# Patient Record
Sex: MALE | Race: WHITE | NOT HISPANIC OR LATINO | URBAN - METROPOLITAN AREA
[De-identification: names, ages, dates, MRNs, and addresses within clinical notes are randomized per-mention and may not be internally consistent; named-entity substitution may affect disease eponyms.]

---

## 2017-02-28 ENCOUNTER — APPOINTMENT (RX ONLY)
Dept: URBAN - METROPOLITAN AREA CLINIC 46 | Facility: CLINIC | Age: 32
Setting detail: DERMATOLOGY
End: 2017-02-28

## 2017-02-28 DIAGNOSIS — L64.8 OTHER ANDROGENIC ALOPECIA: ICD-10-CM

## 2017-02-28 PROCEDURE — ? COSMETIC CONSULTATION - NEOGRAFT

## 2017-03-07 ENCOUNTER — APPOINTMENT (RX ONLY)
Dept: URBAN - METROPOLITAN AREA CLINIC 46 | Facility: CLINIC | Age: 32
Setting detail: DERMATOLOGY
End: 2017-03-07

## 2017-03-07 VITALS — DIASTOLIC BLOOD PRESSURE: 70 MMHG | SYSTOLIC BLOOD PRESSURE: 110 MMHG

## 2017-03-07 DIAGNOSIS — L64.8 OTHER ANDROGENIC ALOPECIA: ICD-10-CM

## 2017-03-07 PROCEDURE — ? NEOGRAFT HAIR RESTORATION

## 2017-03-07 NOTE — HPI: COSMETIC PROCEDURE
Additional History: Patient is here for a Neograft procedure, placing 1200 grafts on his frontal hairline.

## 2017-03-07 NOTE — PROCEDURE: NEOGRAFT HAIR RESTORATION
Detail Level: Zone
Donor Site Anesthesia Type: 1% lidocaine with epinephrine
Price (Use Numbers Only, No Special Characters Or $): 9479.00
Recipient Site Preparation And Placement: Numerous individual transplantation sites were created within the recipient site. The previously harvested follicles were then placed in these transplantation sites.
Donor Site Anesthesia Volume In Cc: 10cc
Consent: Written consent obtained, risks reviewed including but not limited to pain, scarring, infection and incomplete improvement of alopecia.  Patient understands the procedure is cosmetic in nature and will require out of pocket payment.
Hemostasis: Electrocautery
Length Of Donor Strip In Cm If Applicable(Optional): 0
Post-Op: Following the procedure the donor and recipient sites were cleaned and ointment was applied. Postcare instructions were reviewed in detail with the paitent and they were given a copy to take home. All of the patient's questions were answered prior to leaving.
Donor Location: posterior scalp
Surgeon: Donna
Donor Site Anesthesia Method: local infiltration
Surgical Preparation: After consent was obtained, the donor site and recipient site were cleaned and prepped in the usual fashion.  Anesthesia was obtained as outlined above. The appropriate hair within and immediately surrounding the donor site was trimmed to the desired length.
Estimated Blood Loss (Cc): minimal
Follicle Duvall- Neograft Method: After the donor site was prepped and anesthetized numerous individual follicles were harvested using the NeoGraft procedure.
Wound Check: 7 days
Number Of Grafts (Optional): 1079
Quality Of Donor Hair: good
Recipient Site Anesthesia Volume In Cc: 6cc
Post-Care Instructions: 1. Alcoholic beverages, ibuprofen and aspirin are not to be taken for 48 hours after surgery.  We will provide you with a prescription for pain medications.  2. On limited occasions, minor bleeding may occur from the transplant site or donor area. This can be controlled by applying gentle but firm pressure on the area for not less than 5-10 minutes with a dampened gauze pad which we will provide for you.  PRESS ONLY, DO NOT RUB.  3.  Sleep in an upright position the first night after surgery, either in a recliner or prop up your head with two pillows. This will help prevent swelling of the scalp and forhead. Try not to sleep on your face.  4.  To prevent swelling of the forehead or around the eyes, apply an ice pack (pack of frozen peas will work well) over the eyebrows for 5-10 minutes every hour.  DO NOT APPLY ICE TO THE GRAFTS. If swelling occurs it will appear on the second or third day after surgery. The swelling is part of the normal healing process, and will usually disappear with 48 hours. Begin using ice packs immediately after surgery and continue for the next 72 hours. Do not use hot compresses, this will intensify swelling.  5. Scabbing will form over the transplant sites and solidify the first few hours after the procedure. These scabs will naturally fall off within 2 weeks.  DO NOT PICK OR SCRATCH AT THESE SCABS. Doing so will endanger the healing of the graft and could cause scarring or increase the chance of infection. Don't be alarmed if there are short hairs in the scabs when they fall off. The hair bulb is inside the scalp. It is natrual to loose the hair shaft.  6. Do not wash your head for 24 hours after the surgery. After 24 hours, lightly wet the hair in the shower, spread shampoo in your hands and gently pat the shampoo on, DO NOT RUB. Rinse lightly in the shower and do not let the full strength of the flow hit your head, it could dislodge some of the grafts for the first 5 days. It is recommended to wash your head daily for the first 2 weeks.  7. For the first week be careful of what kind of strenous activity you perform. Weight lifting and intense workouts should not be done for 7 days. Most activitites in the work place should be easily resumed a day or two after the procedure. Please ask us if you have any questions or concerns.  8. BE SURE TO TAKE ALL MEDICATIONS PROVIDED FOR YOU AS DIRECTED.  9. With the FUE procedure there are no sutures to remove and it is normal for small holes where the donor hair was removed to bleed some the first night.  10. DO NOTE USE PEROXIDE on your scalp or other ointments unless instructed by the physician. This could affect the healing or success of the procedure.  11. Avoid direct sunlight on the head (a loose fitting hat can be worn initially while the recipient and donor sites heal).
Size Of Graft (Optional): 1mm
Indication: Hairline Restoration

## 2017-03-21 ENCOUNTER — APPOINTMENT (RX ONLY)
Dept: URBAN - METROPOLITAN AREA CLINIC 46 | Facility: CLINIC | Age: 32
Setting detail: DERMATOLOGY
End: 2017-03-21

## 2017-03-21 DIAGNOSIS — Z41.9 ENCOUNTER FOR PROCEDURE FOR PURPOSES OTHER THAN REMEDYING HEALTH STATE, UNSPECIFIED: ICD-10-CM

## 2017-03-21 PROCEDURE — ? COSMETIC FOLLOW-UP

## 2021-09-22 NOTE — PROCEDURE: COSMETIC FOLLOW-UP
Detail Level: Generalized
Side Effects Or Complications: None
Patient Satisfaction: Very pleased
Global Improvement: Very Good
71